# Patient Record
Sex: FEMALE | Race: WHITE | NOT HISPANIC OR LATINO
[De-identification: names, ages, dates, MRNs, and addresses within clinical notes are randomized per-mention and may not be internally consistent; named-entity substitution may affect disease eponyms.]

---

## 2022-03-18 PROBLEM — Z00.00 ENCOUNTER FOR PREVENTIVE HEALTH EXAMINATION: Status: ACTIVE | Noted: 2022-03-18

## 2022-03-22 ENCOUNTER — NON-APPOINTMENT (OUTPATIENT)
Age: 57
End: 2022-03-22

## 2022-03-22 ENCOUNTER — APPOINTMENT (OUTPATIENT)
Dept: PULMONOLOGY | Facility: CLINIC | Age: 57
End: 2022-03-22
Payer: COMMERCIAL

## 2022-03-22 VITALS
RESPIRATION RATE: 16 BRPM | WEIGHT: 207 LBS | HEIGHT: 60 IN | OXYGEN SATURATION: 99 % | BODY MASS INDEX: 40.64 KG/M2 | DIASTOLIC BLOOD PRESSURE: 90 MMHG | SYSTOLIC BLOOD PRESSURE: 148 MMHG | HEART RATE: 84 BPM

## 2022-03-22 DIAGNOSIS — Z80.3 FAMILY HISTORY OF MALIGNANT NEOPLASM OF BREAST: ICD-10-CM

## 2022-03-22 DIAGNOSIS — I10 ESSENTIAL (PRIMARY) HYPERTENSION: ICD-10-CM

## 2022-03-22 DIAGNOSIS — K21.9 GASTRO-ESOPHAGEAL REFLUX DISEASE W/OUT ESOPHAGITIS: ICD-10-CM

## 2022-03-22 DIAGNOSIS — G47.33 OBSTRUCTIVE SLEEP APNEA (ADULT) (PEDIATRIC): ICD-10-CM

## 2022-03-22 DIAGNOSIS — Z82.49 FAMILY HISTORY OF ISCHEMIC HEART DISEASE AND OTHER DISEASES OF THE CIRCULATORY SYSTEM: ICD-10-CM

## 2022-03-22 DIAGNOSIS — R93.89 ABNORMAL FINDINGS ON DIAGNOSTIC IMAGING OF OTHER SPECIFIED BODY STRUCTURES: ICD-10-CM

## 2022-03-22 DIAGNOSIS — Z78.9 OTHER SPECIFIED HEALTH STATUS: ICD-10-CM

## 2022-03-22 PROCEDURE — 99204 OFFICE O/P NEW MOD 45 MIN: CPT

## 2022-03-22 NOTE — DISCUSSION/SUMMARY
[FreeTextEntry1] : 56-year-old female seen today for the above.  Patient's complaints of cough most likely on the basis of painless reflux.  No evidence to suggest sleep apnea in her clinical history but the patient does have anthropometric evidence to suggest that.  In addition her chest x-ray may be underpenetrated but a noncontrast chest CT will be performed to rule out interstitial lung disease.  A trial of proton pump inhibitor has been instituted

## 2022-03-22 NOTE — CONSULT LETTER
[Dear  ___] : Dear  [unfilled], [Consult Letter:] : I had the pleasure of evaluating your patient, [unfilled]. [Please see my note below.] : Please see my note below. [Consult Closing:] : Thank you very much for allowing me to participate in the care of this patient.  If you have any questions, please do not hesitate to contact me. [Sincerely,] : Sincerely, [FreeTextEntry3] : Lenin Napoles MD FCCP\par Pulmonary/Critical Care/Sleep Medicine\par Department of Internal Medicine\par \par [facility] [employer]\par

## 2022-03-22 NOTE — HISTORY OF PRESENT ILLNESS
[Never] : never [TextBox_4] : 56-year-old non-smoker seen today for complaints of a chronic cough times many years.  Patient states that she is unable to give an exact time when her cough began.  She states that it fluctuates with her weight and usually notices it worse in the morning abating by the afternoon.  Her cough is located at the base of her neck.  No change in voice sinus headaches or postnasal drip.  She does have a history of reflux documented on endoscopy several years ago.  There is no history of leg edema, paroxysmal nocturnal dyspnea or orthopnea.  She has had no change in home or work environment. [Awakes Unrefreshed] : does not awaken unrefreshed [Awakes with Dry Mouth] : does not awaken with dry mouth [Awakes with Headache] : does not awaken with headache [Daytime Somnolence] : denies daytime somnolence [Fatigue] : no fatigue [Snoring] : no snoring [Witnessed Apneas] : no witnessed apneas

## 2022-03-31 ENCOUNTER — APPOINTMENT (OUTPATIENT)
Dept: CT IMAGING | Facility: CLINIC | Age: 57
End: 2022-03-31

## 2022-04-13 DIAGNOSIS — R05.3 CHRONIC COUGH: ICD-10-CM

## 2022-04-29 ENCOUNTER — APPOINTMENT (OUTPATIENT)
Dept: PULMONOLOGY | Facility: CLINIC | Age: 57
End: 2022-04-29

## 2022-05-18 RX ORDER — OMEPRAZOLE 40 MG/1
40 CAPSULE, DELAYED RELEASE ORAL
Qty: 90 | Refills: 0 | Status: ACTIVE | COMMUNITY
Start: 2022-03-22 | End: 1900-01-01